# Patient Record
Sex: MALE | Race: BLACK OR AFRICAN AMERICAN | NOT HISPANIC OR LATINO | URBAN - METROPOLITAN AREA
[De-identification: names, ages, dates, MRNs, and addresses within clinical notes are randomized per-mention and may not be internally consistent; named-entity substitution may affect disease eponyms.]

---

## 2024-11-07 ENCOUNTER — OFFICE VISIT (OUTPATIENT)
Dept: URGENT CARE | Age: 34
End: 2024-11-07
Payer: COMMERCIAL

## 2024-11-07 VITALS
TEMPERATURE: 98.4 F | DIASTOLIC BLOOD PRESSURE: 87 MMHG | HEART RATE: 87 BPM | OXYGEN SATURATION: 96 % | SYSTOLIC BLOOD PRESSURE: 132 MMHG | RESPIRATION RATE: 18 BRPM

## 2024-11-07 DIAGNOSIS — J02.9 SORE THROAT: ICD-10-CM

## 2024-11-07 DIAGNOSIS — J02.9 PHARYNGITIS, UNSPECIFIED ETIOLOGY: Primary | ICD-10-CM

## 2024-11-07 LAB — POC RAPID STREP: NEGATIVE

## 2024-11-07 RX ORDER — AZITHROMYCIN 250 MG/1
TABLET, FILM COATED ORAL
Qty: 6 TABLET | Refills: 0 | Status: SHIPPED | OUTPATIENT
Start: 2024-11-07 | End: 2024-11-12

## 2024-11-07 RX ORDER — CHLORTHALIDONE 25 MG/1
1 TABLET ORAL
COMMUNITY
Start: 2024-09-19

## 2024-11-07 ASSESSMENT — ENCOUNTER SYMPTOMS: SORE THROAT: 1

## 2024-11-07 NOTE — PROGRESS NOTES
Subjective   Patient ID: Gary Pleitez is a 34 y.o. male. They present today with a chief complaint of Sore Throat (Sore throat and slight ear pain for 2 days).    History of Present Illness    Sore Throat       This is a 34-year-old -American male presents to the urgent care today complaining of sore throat onset last evening.  He denies any fever or chills.  He denies any nausea or vomiting.  He denies any neck pain.  Past Medical History  Allergies as of 11/07/2024    (No Known Allergies)       (Not in a hospital admission)       History reviewed. No pertinent past medical history.    History reviewed. No pertinent surgical history.         Review of Systems  Review of Systems   HENT:  Positive for sore throat.    All other systems reviewed and are negative.                                 Objective    Vitals:    11/07/24 1030   BP: 132/87   Pulse: 87   Resp: 18   Temp: 36.9 °C (98.4 °F)   SpO2: 96%     No LMP for male patient.    Physical Exam  Patient is awake alert oriented x 3 in no acute distress vital signs are stable.  He is afebrile.  Throat slightly erythematous.  Tonsils are large.  No trismus.  Uvula in the midline.  No anterior cervical adenopathy.  TMs bilaterally intact.  EACs were patent.  Nares clear.  Cardiovascular regular rate and rhythm.  Lungs are clear throughout.  Procedures    Point of Care Test & Imaging Results from this visit  Results for orders placed or performed in visit on 11/07/24   POCT rapid strep A manually resulted   Result Value Ref Range    POC Rapid Strep Negative Negative      No results found.    Diagnostic study results (if any) were reviewed by Fabio Orozco DO.    Assessment/Plan   Allergies, medications, history, and pertinent labs/EKGs/Imaging reviewed by Fabio Orozco DO.     Medical Decision Making  Rule out strep    Orders and Diagnoses  Diagnoses and all orders for this visit:  Pharyngitis, unspecified etiology  -     azithromycin  (Zithromax) 250 mg tablet; Take 2 tabs (500 mg) by mouth today, than 1 daily for 4 days.  Sore throat  -     POCT rapid strep A manually resulted      Medical Admin Record      Patient disposition: Home    Electronically signed by Fabio Orozco DO  10:42 AM